# Patient Record
Sex: FEMALE | Race: WHITE | NOT HISPANIC OR LATINO | Employment: STUDENT | URBAN - METROPOLITAN AREA
[De-identification: names, ages, dates, MRNs, and addresses within clinical notes are randomized per-mention and may not be internally consistent; named-entity substitution may affect disease eponyms.]

---
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
## 2022-05-18 ENCOUNTER — TELEPHONE (OUTPATIENT)
Dept: OBSTETRICS AND GYNECOLOGY | Facility: CLINIC | Age: 22
End: 2022-05-18
Payer: COMMERCIAL

## 2022-05-18 DIAGNOSIS — Z30.011 ENCOUNTER FOR BCP (BIRTH CONTROL PILLS) INITIAL PRESCRIPTION: ICD-10-CM

## 2023-01-03 NOTE — PROGRESS NOTES
OBSTETRICS AND GYNECOLOGY      Chief Complaint:  Well Woman Exam     HPI:      Kelley Barnett is a 22 y.o.  who presents today for well woman exam.  LMP: Patient's last menstrual period was 2022. Specifically, patient denies abnormal vaginal bleeding, abnormal discharge/odor, pelvic pain, or dysuria/hematuria. Ms. Barnett is currently sexually active with a single male partner. She is currently using no method for contraception. She declines STD screening today. She denies acute issues, problems, or complaints.     Gardasil:Completed   Ms. Barnett confirms that she wears her seatbelt when riding in the car and does not text while driving.     OB History          0    Para   0    Term   0       0    AB   0    Living   0         SAB   0    IAB   0    Ectopic   0    Multiple   0    Live Births   0                 ROS:     GENERAL: Feeling well overall.   SKIN: Denies rash or lesions.   BREASTS: Denies lumps or nipple discharge.   URINARY: Denies dysuria, hematuria.  NEUROLOGIC: Denies syncope, falls.     Physical Exam:      PHYSICAL EXAM:  /70 (BP Location: Left arm, Patient Position: Sitting, BP Method: Medium (Manual))   Wt 88.9 kg (195 lb 15.8 oz)   LMP 2022   BMI 39.58 kg/m²   Body mass index is 39.58 kg/m².     APPEARANCE:  Well nourished, well developed, in no acute distress.  Able to smile appropriately during our encounter. Makes eye contact. Pleasant.  PSYCH: Appropriate mood and affect.  SKIN:   No acne or hirsutism.  CARDIOVASCULAR:  No edema of peripheral extremities. Well perfused throughout.  RESP:  No accessory muscle use to breathe. Speaking comfortably in complete sentences.   ABDOMEN:  Soft. No tenderness or masses.      Assessment/Plan:     Well Woman Exam  -- Counseled patient regarding healthy diet and regular exercise, daily seat belt use.   -- Discussed weight, ideal BMI <= 25, working on this with PCP, undergoing evaluation. About to start ozempic.   --  BP normotensive  -- She denies abuse and feels safe at home.  -- Immunizations:  flu not yet obtained  -- Pap smear:  reviewed start at age 23  -- Contraception:  OCPs previously, not currently taking (see below)  -- STD screening:  declines     PCOS Concern  - Patient reports PCP concerned about possibility of this diagnosis  - Patient reports had labs drawn yesterday for evaluation, pending results  Patient to send these results   - Reviewed diagnosis based on oligomenorrhea, clinical signs of hyperandrogenism, and polycystic ovaries  Does have history of oligomenorrhea - has a period for about 6 months, though timing always irregular, would then sporadically skip a month   No acne concern, denies excess hair growth  - Discussed diagnosis with the patient; discussed increased risk for infertility, cardiovascular disease, metabolic syndrome, permanent terminal hair growth (e.g. face, abdomen, chest, thighs), and endometrial hyperplasia  - Pending labs, consider pelvic US to assess ovaries (currently 1/3 criteria met)  - Consider Metformin    Contraception Counseling  -- Noncontraceptive benefits of hormonal contraceptives includes:  menstrual cycle regularity, improvement of menorrhagia, improvement of dysmenorrhea, ability to induce amenorrhea for lifestyle considerations (wedding, swim meet, etc.), help PMS, prevent menstrual migraines, can decrease endometrial, ovarian, colorectal cancer risk, can improve acne/hirsutism, decrease bleeding due to leiomyomas, decrease pelvic pain from endometriosis.  -- Screening questions:  She declined STD screening today.   She is not a smoker.   She does not have a family history of VTE, early MI or strokes.   She does not have a h/o migraine with aura or VTE herself.  -- Counseled patient regarding various methods of birth control methods available, to include: OCPs, progesterone only pills, IUD (hormonal vs copper), patch, Depo-Provera, Nuvaring, condoms, Nexplanon, BTL, and  vasectomy. After discussing risks and benefits of each method, she is considering the patch. Literature provided.   -- Patient does not have a major contraindication for the use of this birth control method, including:  No Migraine with aura   No HTN  No Decompensated cirrhosis  No Smoking   No SLE with positive or unknown Antiphospholipid antibodies  No Diabetes with retinopathy or nephropathy or neuropathy or diabetes x 20yrs  -- I specifically told her to seek medical attention should she develop shortness of breath, chest pain, severe headache, painful leg swelling.  -- Estrogen administration increases risk of VTE in women with a history of prior thromboembolism or known thrombogenic mutation by increasing hepatic production of coagulant factors.  -- Condoms for STD protection were discussed, as was Plan B/emergency contraception in the event of unprotected intercourse.   -- UPT negative today  -- We reviewed decreased efficacy with BMI >30 and rec for back up method and risk of VTE  -- To investigate if appropriate while on ozempic  -- Patient not interested in contraception bridge at this time      Follow up in about 1 year (around 1/6/2024) for WWE.    Counseling:     Patient was counseled today on current ASCCP pap guidelines, the recommendation for yearly physical exams, safe driving habits, and breast self awareness. She is to see her PCP for other health maintenance.     Use of the [employer] ([employer]) Patient Portal discussed and encouraged during today's visit.                 As of April 1, 2021, the Cures Act has been passed nationally. This new law requires that all doctors progress notes, lab results, pathology reports and radiology reports be released IMMEDIATELY to the patient in the patient portal. That means that the results are released to you at the EXACT same time they are released to me. Therefore, with all of the patients that I have I am not able to reply to each patient exactly when the results come  in. So there will be a delay from when you see the results to when I see them and have time to come up with a response to send you. Also I only see these results when I am on the computer at work. So if the results come in over the weekend or after 5 pm of a work day, I will not see them until the next business day. As you can tell, this is a challenge as a physician to give every patient the quick response they hope for and deserve. So please be patient!   Thanks for your understanding and patience.

## 2023-01-06 ENCOUNTER — OFFICE VISIT (OUTPATIENT)
Dept: OBSTETRICS AND GYNECOLOGY | Facility: CLINIC | Age: 23
End: 2023-01-06
Payer: COMMERCIAL

## 2023-01-06 VITALS — BODY MASS INDEX: 39.58 KG/M2 | SYSTOLIC BLOOD PRESSURE: 118 MMHG | DIASTOLIC BLOOD PRESSURE: 70 MMHG | WEIGHT: 196 LBS

## 2023-01-06 DIAGNOSIS — Z30.09 BIRTH CONTROL COUNSELING: ICD-10-CM

## 2023-01-06 DIAGNOSIS — Z01.419 ENCOUNTER FOR WELL WOMAN EXAM: Primary | ICD-10-CM

## 2023-01-06 DIAGNOSIS — Z30.011 ENCOUNTER FOR BCP (BIRTH CONTROL PILLS) INITIAL PRESCRIPTION: ICD-10-CM

## 2023-01-06 LAB
B-HCG UR QL: NEGATIVE
CTP QC/QA: YES

## 2023-01-06 PROCEDURE — 99395 PREV VISIT EST AGE 18-39: CPT | Mod: S$GLB,,, | Performed by: STUDENT IN AN ORGANIZED HEALTH CARE EDUCATION/TRAINING PROGRAM

## 2023-01-06 PROCEDURE — 99999 PR PBB SHADOW E&M-EST. PATIENT-LVL II: CPT | Mod: PBBFAC,,, | Performed by: STUDENT IN AN ORGANIZED HEALTH CARE EDUCATION/TRAINING PROGRAM

## 2023-01-06 PROCEDURE — 99999 PR PBB SHADOW E&M-EST. PATIENT-LVL II: ICD-10-PCS | Mod: PBBFAC,,, | Performed by: STUDENT IN AN ORGANIZED HEALTH CARE EDUCATION/TRAINING PROGRAM

## 2023-01-06 PROCEDURE — 81025 POCT URINE PREGNANCY: ICD-10-PCS | Mod: S$GLB,,, | Performed by: STUDENT IN AN ORGANIZED HEALTH CARE EDUCATION/TRAINING PROGRAM

## 2023-01-06 PROCEDURE — 81025 URINE PREGNANCY TEST: CPT | Mod: S$GLB,,, | Performed by: STUDENT IN AN ORGANIZED HEALTH CARE EDUCATION/TRAINING PROGRAM

## 2023-01-06 PROCEDURE — 99395 PR PREVENTIVE VISIT,EST,18-39: ICD-10-PCS | Mod: S$GLB,,, | Performed by: STUDENT IN AN ORGANIZED HEALTH CARE EDUCATION/TRAINING PROGRAM

## 2023-01-08 ENCOUNTER — PATIENT MESSAGE (OUTPATIENT)
Dept: OBSTETRICS AND GYNECOLOGY | Facility: CLINIC | Age: 23
End: 2023-01-08
Payer: COMMERCIAL

## 2023-01-08 DIAGNOSIS — Z30.011 ENCOUNTER FOR BCP (BIRTH CONTROL PILLS) INITIAL PRESCRIPTION: Primary | ICD-10-CM

## 2023-01-09 RX ORDER — NORELGESTROMIN AND ETHINYL ESTRADIOL 35; 150 UG/MG; UG/MG
1 PATCH TRANSDERMAL WEEKLY
Qty: 4 PATCH | Refills: 3 | Status: SHIPPED | OUTPATIENT
Start: 2023-01-09 | End: 2023-03-10 | Stop reason: SDUPTHER

## 2023-03-03 NOTE — PROGRESS NOTES
The chief complaint leading to consultation is: contraception f/u    Visit type: audiovisual    Face to Face time with patient: approximately 7 minutes  20 minutes of total time spent on the encounter, which includes face to face time and non-face to face time preparing to see the patient (eg, review of tests), Obtaining and/or reviewing separately obtained history, Documenting clinical information in the electronic or other health record, Independently interpreting results (not separately reported) and communicating results to the patient/family/caregiver, or Care coordination (not separately reported).     Each patient to whom he or she provides medical services by telemedicine is:  (1) informed of the relationship between the physician and patient and the respective role of any other health care provider with respect to management of the patient; and (2) notified that he or she may decline to receive medical services by telemedicine and may withdraw from such care at any time.      OBSTETRICS AND GYNECOLOGY    Subjective:      Chief Complaint: Contraception Management    HPI:  Kelley Barnett is an 22 y.o.  presenting for scheduled virtual appointment regarding contraception follow-up. Taking the patch. Started about 3 months ago. Has had at least 2 periods while on the patch. Has not appreciated any big change in bleeding or cramping amount. Switching out weekly. Does feel some breast growth has occurred. Had some bumps on arm where patch has been, these have resolved during this week of her menses. No other noted side effects. Wishes to continue this modality.    Review of systems:  Denies nausea/vomiting, chest pain, shortness of breath, abnormal vaginal discharge.     OB History    Para Term  AB Living   0 0 0 0 0 0   SAB IAB Ectopic Multiple Live Births   0 0 0 0 0     Past Medical History:   Diagnosis Date    Allergy     on allergy shots    Constipation     Multiple food allergies      corn    Vision abnormalities      Past Surgical History:   Procedure Laterality Date    EYE SURGERY      gallbladder removed      TONSILLECTOMY       Family History   Problem Relation Age of Onset    Hyperlipidemia Mother         not on meds    Hyperlipidemia Father     Obesity Father     Sleep apnea Father     Hypertension Father     Hyperlipidemia Maternal Grandmother     Dementia Maternal Grandmother     Diabetes Maternal Grandmother     Hyperlipidemia Maternal Grandfather     Hypertension Maternal Grandfather     Cancer Paternal Grandmother     Heart disease Paternal Grandfather          in 50s       Allergies:   Review of patient's allergies indicates:   Allergen Reactions    Grass pollen- grass standard        Medications:   Current Outpatient Medications   Medication Sig Dispense Refill    methylphenidate HCl (RITALIN) 20 MG tablet Take 20 mg by mouth 2 (two) times daily.      norelgestromin-ethinyl estradiol 150-35 mcg/24 hr Place 1 patch onto the skin once a week. 4 patch 3    norethindrone-e.estradioL-iron (LO LOESTRIN FE) 1 mg-10 mcg (24)/10 mcg (2) Tab Take 1 tablet by mouth once daily. 28 tablet 2     No current facility-administered medications for this visit.       Social History     Tobacco Use    Smoking status: Never    Smokeless tobacco: Never   Substance Use Topics    Alcohol use: Yes     Alcohol/week: 0.0 standard drinks       Objective:   There were no vitals taken for this visit. Virtual visit.  Physical Exam    GENERAL: Alert, well dressed, well nourished. Appropriate mood and affect. Pleasant. Speaking comfortably in full sentences.     Assessment/Plan:     Contraception Management  - Refills provided today, pharmacy verified  - Discussed changing location of the patch with each use, to let me know if any skin irritation persists  - Reviewed how to take, efficacy, risk of VTE, condoms for STD prevention and back-up method in setting of BMI >30  - To let me know if any issues arise  -  Started Metformin as well, doing well now, no longer the nausea/upset stomach side effects      Follow up for annual WWE or sooner PRN      As of April 1, 2021, the Cures Act has been passed nationally. This new law requires that all doctors progress notes, lab results, pathology reports and radiology reports be released IMMEDIATELY to the patient in the patient portal. That means that the results are released to you at the EXACT same time they are released to me. Therefore, with all of the patients that I have I am not able to reply to each patient exactly when the results come in. So there will be a delay from when you see the results to when I see them and have time to come up with a response to send you. Also I only see these results when I am on the computer at work. So if the results come in over the weekend or after 5 pm of a work day, I will not see them until the next business day. As you can tell, this is a challenge as a physician to give every patient the quick response they hope for and deserve. So please be patient!   Thanks for your understanding and patience.

## 2023-03-10 ENCOUNTER — OFFICE VISIT (OUTPATIENT)
Dept: OBSTETRICS AND GYNECOLOGY | Facility: CLINIC | Age: 23
End: 2023-03-10
Payer: COMMERCIAL

## 2023-03-10 DIAGNOSIS — Z30.45 ENCOUNTER FOR SURVEILLANCE OF TRANSDERMAL PATCH HORMONAL CONTRACEPTIVE DEVICE: Primary | ICD-10-CM

## 2023-03-10 DIAGNOSIS — Z30.011 ENCOUNTER FOR BCP (BIRTH CONTROL PILLS) INITIAL PRESCRIPTION: ICD-10-CM

## 2023-03-10 PROCEDURE — 99212 OFFICE O/P EST SF 10 MIN: CPT | Mod: 95,,, | Performed by: STUDENT IN AN ORGANIZED HEALTH CARE EDUCATION/TRAINING PROGRAM

## 2023-03-10 PROCEDURE — 99212 PR OFFICE/OUTPT VISIT, EST, LEVL II, 10-19 MIN: ICD-10-PCS | Mod: 95,,, | Performed by: STUDENT IN AN ORGANIZED HEALTH CARE EDUCATION/TRAINING PROGRAM

## 2023-03-10 RX ORDER — NORELGESTROMIN AND ETHINYL ESTRADIOL 35; 150 UG/MG; UG/MG
1 PATCH TRANSDERMAL WEEKLY
Qty: 6 PATCH | Refills: 7 | Status: SHIPPED | OUTPATIENT
Start: 2023-03-10 | End: 2024-03-26 | Stop reason: SDUPTHER

## 2023-10-16 ENCOUNTER — TELEPHONE (OUTPATIENT)
Dept: OBSTETRICS AND GYNECOLOGY | Facility: CLINIC | Age: 23
End: 2023-10-16
Payer: COMMERCIAL

## 2023-10-16 NOTE — TELEPHONE ENCOUNTER
Called pt to schedule annual   No answer  Mail box full  Could not leave message      Scheduled annual on 01-08-24 with Dr. Mccarthy at [facility] for 11 am    ND

## 2023-10-16 NOTE — TELEPHONE ENCOUNTER
----- Message from Mary Molina sent at 10/16/2023  9:56 AM CDT -----  Type:   Appointment Request        Name of Caller:Portal Message   When is the first available appointment?N/A   Symptoms:pap smear   Best Call Back Number:[phone]   Additional Information:        Message  Appointment Request From: Kelley Barnett  With Provider: Elizabeh Guarisco, MD [[facility] [place] [address]]  Preferred Date Range: 11/21/2023 - 12/22/2023  Preferred Times: Any Time  Reason for visit: Pap smear    Comments:  Pap smear

## 2024-03-26 DIAGNOSIS — Z30.011 ENCOUNTER FOR BCP (BIRTH CONTROL PILLS) INITIAL PRESCRIPTION: ICD-10-CM

## 2024-03-27 RX ORDER — NORELGESTROMIN AND ETHINYL ESTRADIOL 150; 35 UG/D; UG/D
PATCH TRANSDERMAL
Qty: 3 PATCH | Refills: 25 | OUTPATIENT
Start: 2024-03-27

## 2024-03-27 RX ORDER — NORELGESTROMIN AND ETHINYL ESTRADIOL 35; 150 UG/MG; UG/MG
1 PATCH TRANSDERMAL WEEKLY
Qty: 9 PATCH | Refills: 0 | Status: SHIPPED | OUTPATIENT
Start: 2024-03-27

## 2024-03-27 NOTE — TELEPHONE ENCOUNTER
Refill Decision Note   Kelley Barnett  is requesting a refill authorization.  Brief Assessment and Rationale for Refill:  Approve     Medication Therapy Plan:        Pharmacist review requested: Yes   Extended chart review required: Yes   Comments:     Note composed:11:13 AM 03/27/2024

## 2024-03-27 NOTE — TELEPHONE ENCOUNTER
Refill Decision Note   Kelley Barnett  is requesting a refill authorization.  Brief Assessment and Rationale for Refill:  Quick Discontinue     Medication Therapy Plan:  Duplicate routed to [facility] for review in separate encounter      Comments:     Note composed:9:49 AM 03/27/2024

## 2024-03-27 NOTE — TELEPHONE ENCOUNTER
Refill Routing Note   Medication(s) are not appropriate for processing by [facility] for the following reason(s):        Drug-disease interaction    [facility] action(s):  Defer        Medication Therapy Plan: Due for appt; Drug-Disease: norelgestromin-ethinyl estradiol and Abnormal weight gain    Pharmacist review requested: Yes     Appointments  past 12m or future 3m with PCP    Date Provider   Last Visit   3/10/2023 Gia Mccarthy MD   Next Visit   Visit date not found Gia Mccarthy MD   ED visits in past 90 days: 0        Note composed:9:48 AM 03/27/2024

## 2025-08-14 ENCOUNTER — OFFICE VISIT (OUTPATIENT)
Dept: PAIN MEDICINE | Facility: CLINIC | Age: 25
End: 2025-08-14
Payer: COMMERCIAL

## 2025-08-14 VITALS
HEIGHT: 59 IN | DIASTOLIC BLOOD PRESSURE: 81 MMHG | BODY MASS INDEX: 24.4 KG/M2 | WEIGHT: 121.06 LBS | SYSTOLIC BLOOD PRESSURE: 123 MMHG | HEART RATE: 60 BPM

## 2025-08-14 DIAGNOSIS — M21.42 FLAT FEET, BILATERAL: ICD-10-CM

## 2025-08-14 DIAGNOSIS — M53.3 COCCYDYNIA: Primary | ICD-10-CM

## 2025-08-14 DIAGNOSIS — M21.41 FLAT FEET, BILATERAL: ICD-10-CM

## 2025-08-14 DIAGNOSIS — M53.3 SACROILIAC JOINT PAIN: ICD-10-CM

## 2025-08-14 PROCEDURE — 99999 PR PBB SHADOW E&M-EST. PATIENT-LVL III: CPT | Mod: PBBFAC,,, | Performed by: STUDENT IN AN ORGANIZED HEALTH CARE EDUCATION/TRAINING PROGRAM

## 2025-08-14 RX ORDER — IBUPROFEN 800 MG/1
800 TABLET, FILM COATED ORAL EVERY 8 HOURS PRN
Qty: 90 TABLET | Refills: 0 | Status: SHIPPED | OUTPATIENT
Start: 2025-08-14 | End: 2025-09-13

## 2025-08-15 ENCOUNTER — HOSPITAL ENCOUNTER (OUTPATIENT)
Dept: RADIOLOGY | Facility: HOSPITAL | Age: 25
Discharge: HOME | End: 2025-08-15
Attending: STUDENT IN AN ORGANIZED HEALTH CARE EDUCATION/TRAINING PROGRAM
Payer: COMMERCIAL

## 2025-08-15 DIAGNOSIS — M53.3 COCCYDYNIA: ICD-10-CM

## 2025-08-15 DIAGNOSIS — M53.3 SACROILIAC JOINT PAIN: ICD-10-CM

## 2025-08-15 PROCEDURE — 72190 X-RAY EXAM OF PELVIS: CPT | Mod: TC

## 2025-08-15 PROCEDURE — 72190 X-RAY EXAM OF PELVIS: CPT | Mod: 26,,, | Performed by: RADIOLOGY

## 2025-08-29 ENCOUNTER — TELEPHONE (OUTPATIENT)
Dept: PODIATRY | Facility: CLINIC | Age: 25
End: 2025-08-29
Payer: COMMERCIAL